# Patient Record
Sex: FEMALE | Race: WHITE | URBAN - METROPOLITAN AREA
[De-identification: names, ages, dates, MRNs, and addresses within clinical notes are randomized per-mention and may not be internally consistent; named-entity substitution may affect disease eponyms.]

---

## 2021-12-23 ENCOUNTER — OFFICE VISIT (OUTPATIENT)
Dept: URBAN - METROPOLITAN AREA CLINIC 44 | Facility: CLINIC | Age: 66
End: 2021-12-23
Payer: MEDICARE

## 2021-12-23 DIAGNOSIS — H52.223 REGULAR ASTIGMATISM, BILATERAL: ICD-10-CM

## 2021-12-23 DIAGNOSIS — H43.393 OTHER VITREOUS OPACITIES, BILATERAL: ICD-10-CM

## 2021-12-23 DIAGNOSIS — H25.813 COMBINED FORMS OF AGE-RELATED CATARACT, BILATERAL: ICD-10-CM

## 2021-12-23 DIAGNOSIS — H04.123 TEAR FILM INSUFFICIENCY OF BILATERAL LACRIMAL GLANDS: Primary | ICD-10-CM

## 2021-12-23 PROCEDURE — 92134 CPTRZ OPH DX IMG PST SGM RTA: CPT | Performed by: OPTOMETRIST

## 2021-12-23 PROCEDURE — 92004 COMPRE OPH EXAM NEW PT 1/>: CPT | Performed by: OPTOMETRIST

## 2021-12-23 ASSESSMENT — VISUAL ACUITY
OS: 20/40
OD: 20/25

## 2021-12-23 ASSESSMENT — KERATOMETRY
OS: 42.38
OD: 42.75

## 2021-12-23 ASSESSMENT — INTRAOCULAR PRESSURE
OD: 17
OS: 15

## 2021-12-23 NOTE — IMPRESSION/PLAN
Impression: Tear film insufficiency of bilateral lacrimal glands: H04.123. Clinical evaluation shows mild DED signs. Patient reports no or occasional symptoms not interfering with daily activities. Plan: PLAN: Warm compresses for 10 minutes AM (Richard Mask or equal). Lipid based tears to be used 3-4 X daily. Rec. 2000 mg Triglyceride based fish oil (Example: Coromega, PRN) to be taken daily. RTC if symptom's worsen otherwise follow up in 12 months (Handout given to patient).

## 2021-12-23 NOTE — IMPRESSION/PLAN
Impression: Combined forms of age-related cataract, bilateral: H25.813. Visually non-significant cataracts. Patient asymptomatic and comfortable with current level of vision. Plan: PLAN: RTC 12 months for complete exam complete + possible OCT (Mac). RTC sooner if patient symptoms become worse.